# Patient Record
Sex: MALE | Employment: PART TIME | URBAN - METROPOLITAN AREA
[De-identification: names, ages, dates, MRNs, and addresses within clinical notes are randomized per-mention and may not be internally consistent; named-entity substitution may affect disease eponyms.]

---

## 2023-10-04 ENCOUNTER — TELEPHONE (OUTPATIENT)
Dept: HEMATOLOGY/ONCOLOGY | Facility: CLINIC | Age: 23
End: 2023-10-04
Payer: COMMERCIAL

## 2023-11-01 PROBLEM — D68.00 VON WILLEBRAND DISEASE (MULTI): Status: ACTIVE | Noted: 2023-11-01

## 2023-11-01 PROBLEM — R63.6 UNDERWEIGHT: Status: ACTIVE | Noted: 2023-11-01

## 2023-11-01 PROBLEM — J02.9 PHARYNGITIS: Status: ACTIVE | Noted: 2023-11-01

## 2023-11-01 RX ORDER — AMINOCAPROIC ACID 0.25 G/ML
4 SOLUTION ORAL EVERY 8 HOURS
COMMUNITY
Start: 2015-08-13 | End: 2024-05-06 | Stop reason: ALTCHOICE

## 2023-11-01 ASSESSMENT — PATIENT HEALTH QUESTIONNAIRE - PHQ9
2. FEELING DOWN, DEPRESSED, IRRITABLE, OR HOPELESS: 0
7. TROUBLE CONCENTRATING ON THINGS, SUCH AS READING THE NEWSPAPER OR WATCHING TELEVISION: 0
9. THOUGHTS THAT YOU WOULD BE BETTER OFF DEAD, OR OF HURTING YOURSELF: 0
8. MOVING OR SPEAKING SO SLOWLY THAT OTHER PEOPLE COULD HAVE NOTICED. OR THE OPPOSITE, BEING SO FIGETY OR RESTLESS THAT YOU HAVE BEEN MOVING AROUND A LOT MORE THAN USUAL: 0
1. LITTLE INTEREST OR PLEASURE IN DOING THINGS: 0
4. FEELING TIRED OR HAVING LITTLE ENERGY: 0
4. FEELING TIRED OR HAVING LITTLE ENERGY: NOT AT ALL
5. POOR APPETITE OR OVEREATING: 0
9. THOUGHTS THAT YOU WOULD BE BETTER OFF DEAD, OR OF HURTING YOURSELF: NOT AT ALL
1. LITTLE INTEREST OR PLEASURE IN DOING THINGS: NOT AT ALL
SUM OF ALL RESPONSES TO PHQ QUESTIONS 1-9: 0
8. MOVING OR SPEAKING SO SLOWLY THAT OTHER PEOPLE COULD HAVE NOTICED. OR THE OPPOSITE, BEING SO FIGETY OR RESTLESS THAT YOU HAVE BEEN MOVING AROUND A LOT MORE THAN USUAL: NOT AT ALL
SUM OF ALL RESPONSES TO PHQ QUESTIONS 1-9: 0
7. TROUBLE CONCENTRATING ON THINGS, SUCH AS READING THE NEWSPAPER OR WATCHING TELEVISION: NOT AT ALL
3. TROUBLE FALLING OR STAYING ASLEEP OR SLEEPING TOO MUCH: 0
4. FEELING TIRED OR HAVING LITTLE ENERGY: NOT AT ALL
5. POOR APPETITE OR OVEREATING: NOT AT ALL
9. THOUGHTS THAT YOU WOULD BE BETTER OFF DEAD, OR OF HURTING YOURSELF: NOT AT ALL
1. LITTLE INTEREST OR PLEASURE IN DOING THINGS: NOT AT ALL
2. FEELING DOWN, DEPRESSED, IRRITABLE, OR HOPELESS: NOT AT ALL
6. FEELING BAD ABOUT YOURSELF - OR THAT YOU ARE A FAILURE OR HAVE LET YOURSELF OR YOUR FAMILY DOWN: NOT AT ALL
2. FEELING DOWN, DEPRESSED OR HOPELESS: NOT AT ALL
5. POOR APPETITE OR OVEREATING: NOT AT ALL
6. FEELING BAD ABOUT YOURSELF - OR THAT YOU ARE A FAILURE OR HAVE LET YOURSELF OR YOUR FAMILY DOWN: 0
7. TROUBLE CONCENTRATING ON THINGS, SUCH AS READING THE NEWSPAPER OR WATCHING TELEVISION: NOT AT ALL
3. TROUBLE FALLING OR STAYING ASLEEP OR SLEEPING TOO MUCH: NOT AT ALL
6. FEELING BAD ABOUT YOURSELF - OR THAT YOU ARE A FAILURE OR HAVE LET YOURSELF OR YOUR FAMILY DOWN: NOT AT ALL
8. MOVING OR SPEAKING SO SLOWLY THAT OTHER PEOPLE COULD HAVE NOTICED. OR THE OPPOSITE, BEING SO FIGETY OR RESTLESS THAT YOU HAVE BEEN MOVING AROUND A LOT MORE THAN USUAL: NOT AT ALL
3. TROUBLE FALLING OR STAYING ASLEEP OR SLEEPING TOO MUCH: NOT AT ALL

## 2023-11-01 NOTE — PROGRESS NOTES
Patient ID: Pernell Jaime is a 22 y.o. male.    Subjective    HPI  22 anhidrosis (rather than sweating, he gets pain, red splots, aggravated by labor, began at age 19) presents for evaluation of bleeding.     He comes in with prescription  His dentist wrote for aminocaproic acid 0.25gm/mL 26 mL by mouth every 6 hours for 30 days.   Prescribed for bleeding in the gum from a broken tooth.  Age 3 years old.       He saw a hematologist in [place] and had a bleeding time,  he was diagnosed with a mild clotting disorder, with VWD  no restrictions     Epistaxis: no  Bruising/ Hematoma: no  Minor cutaneous wound: Any prolonged bleeding, longer than 5 minutes: yes  Gum bleeding: not now (when younger)  Dental procedure bleeding:  never had a proceedure  Surgical bleeding: broken pinkie, but not bleeding  Hematemesis, melena or hematochezia: no  ?? Angiodysplasia: no    Blood transfusion: no   Iron therapy: no    Muscle hematomas or hemarthrosis: no     Family history of bleeding:  not clear.  Mother noted to have heavy periods    Objective    BSA: There is no height or weight on file to calculate BSA.  There were no vitals taken for this visit.   Review of Systems   Constitutional:  Negative for diaphoresis.   HENT:  Negative for trouble swallowing.    Eyes:  Negative for visual disturbance.   Respiratory:  Negative for shortness of breath.    Cardiovascular:  Negative for leg swelling.   Gastrointestinal:  Negative for anal bleeding and blood in stool.   Endocrine: Negative for cold intolerance.   Genitourinary:  Negative for hematuria.   Musculoskeletal:  Negative for arthralgias.   Skin:  Positive for rash.   Allergic/Immunologic: Negative for environmental allergies.   Neurological:  Negative for numbness.   Hematological:  Does not bruise/bleed easily.   Psychiatric/Behavioral:  The patient is not nervous/anxious.        Physical Exam  Constitutional:       Appearance: Normal appearance.   HENT:      Head:  Normocephalic.      Right Ear: External ear normal.      Left Ear: External ear normal.      Nose: Nose normal.      Mouth/Throat:      Mouth: Mucous membranes are moist.   Eyes:      Extraocular Movements: Extraocular movements intact.      Pupils: Pupils are equal, round, and reactive to light.   Cardiovascular:      Rate and Rhythm: Normal rate and regular rhythm.   Pulmonary:      Effort: Pulmonary effort is normal.      Breath sounds: Normal breath sounds.   Abdominal:      General: Abdomen is flat. Bowel sounds are normal.   Musculoskeletal:         General: No swelling. Normal range of motion.      Cervical back: Normal range of motion and neck supple.   Skin:     General: Skin is warm.   Neurological:      General: No focal deficit present.      Mental Status: He is alert and oriented to person, place, and time.   Psychiatric:         Mood and Affect: Mood normal.         Behavior: Behavior normal.         Performance Status:        Assessment/Plan   Summary: 22-year-old man with a history of acquired anhidrosis (rather than sweating, he gets pain, red splots, aggravated by labor, began at age 19) presents for evaluation of bleeding.     #Prior diagnosis of von Willebrand disease  History: At age 3 the patient had a broken tooth which bled profusely.  His dentist wrote for aminocaproic acid 0.25gm/mL 26 mL by mouth every 6 hours for 30 days.  This was successful in managing his bleeding.  He then saw a hematologist in [place] and had a bleeding time, as well as other blood work.  He was diagnosed with a mild clotting disorder/ VWD.  However there were no restrictions on his activity.  He has no history of epistaxis, bruising, hematoma, gum bleeding, surgical bleeding, hematemesis, melena, hematochezia.  He has never had iron deficiency blood transfusion.  He does note bleeding from minor cutaneous cuts, lasting longer than 5 minutes.  Family history of bleeding:  not clear.  Mother noted to have heavy  periods  Ferritin 216, TIBC 393, 23% iron saturation  INR 1.08, PTT 32, fibrinogen 154,  ristocetin activity 63   Platelet aggregation: ADP max 31, collagen max 68, epinephrine max 10   Discussion: Platelet functional disorder detected   Possible type II von Willebrand's disease  We discussed the value of Amicar which clinically appears to have been beneficial per the patient's experience in the past.  We discussed that Amicar works On the fourth phase of thrombosis whereas von Willebrands disease and of dysfunctional platelet disorder works on the first phase of thrombosis.  Nevertheless, what Amicar lacks by not targeting the patient's specific bleeding disorder endocrine makes up for by increased convenience.  Plan: Full von Willebrand panel testing    fibrinogen and clotting time  RTC 6 months  Patient will call us before dental extraction:  Arrange for 1 unit platelets in the morning of dental extraction.  Amicar 1gm every 8 hours prn is reasonable     #Acquired Idiopathic Generalized Anhidrosis (AIGA)[Int J Mol Sci. 2021 Aug; 22(16): 1910]   A high frequency of the association with cholinergic urticaria (CholU)  he has not been to a dermatologist.  He gets extreme itching.  associated with decreased expression of cholinergic receptor M3 (CHRM3)  The association of autoimmune complications implies that AIGA is an autoimmune disorder. It has been reported that both CD4+ and CD8+ T cells infiltrate around eccrine gland epithelial cells in AIGA and CholU with anhidrosis/hypohidrosis [17]. Further  T cell subsets have not been investigated in AIGA. The complications provide insights for the subpopulations of T cells. Th17 cells are involved in the pathology of Crohn’s disease and rheumatoid arthritis  no diarrhea, no arthritits, no sodium issues  Tryptase 7.  IgE 4  Alternatively he could have hereditary sensory and autonomic neuropathy type 4 (HSAN4), linked to variants within the tyrosinase-kinase domain of the  NTRK1 gene on chromosome 1q23.1, which can lead to Defects in thermoregulation and anhidrosis  PLAN:   Consider genetic counseling    Cancer Staging   No matching staging information was found for the patient.    Oncology History    No history exists.                 Archie Lane MD

## 2023-11-02 ENCOUNTER — OFFICE VISIT (OUTPATIENT)
Dept: HEMATOLOGY/ONCOLOGY | Facility: CLINIC | Age: 23
End: 2023-11-02
Payer: COMMERCIAL

## 2023-11-02 VITALS
OXYGEN SATURATION: 98 % | HEART RATE: 83 BPM | TEMPERATURE: 96.3 F | WEIGHT: 158.95 LBS | RESPIRATION RATE: 16 BRPM | HEIGHT: 72 IN | BODY MASS INDEX: 21.53 KG/M2 | SYSTOLIC BLOOD PRESSURE: 122 MMHG | DIASTOLIC BLOOD PRESSURE: 79 MMHG

## 2023-11-02 DIAGNOSIS — D69.9 BLEEDING DISORDER (CMS-HCC): ICD-10-CM

## 2023-11-02 DIAGNOSIS — D68.00 VON WILLEBRAND DISEASE (MULTI): Primary | ICD-10-CM

## 2023-11-02 PROCEDURE — 99214 OFFICE O/P EST MOD 30 MIN: CPT | Performed by: INTERNAL MEDICINE

## 2023-11-02 PROCEDURE — 1036F TOBACCO NON-USER: CPT | Performed by: INTERNAL MEDICINE

## 2023-11-02 ASSESSMENT — ENCOUNTER SYMPTOMS
BLOOD IN STOOL: 0
ANAL BLEEDING: 0
LOSS OF SENSATION IN FEET: 0
SHORTNESS OF BREATH: 0
NERVOUS/ANXIOUS: 0
DIAPHORESIS: 0
TROUBLE SWALLOWING: 0
OCCASIONAL FEELINGS OF UNSTEADINESS: 0
DEPRESSION: 0
BRUISES/BLEEDS EASILY: 0
NUMBNESS: 0
ARTHRALGIAS: 0
HEMATURIA: 0

## 2023-11-02 ASSESSMENT — COLUMBIA-SUICIDE SEVERITY RATING SCALE - C-SSRS
1. IN THE PAST MONTH, HAVE YOU WISHED YOU WERE DEAD OR WISHED YOU COULD GO TO SLEEP AND NOT WAKE UP?: NO
2. HAVE YOU ACTUALLY HAD ANY THOUGHTS OF KILLING YOURSELF?: NO
6. HAVE YOU EVER DONE ANYTHING, STARTED TO DO ANYTHING, OR PREPARED TO DO ANYTHING TO END YOUR LIFE?: NO

## 2023-11-02 ASSESSMENT — PATIENT HEALTH QUESTIONNAIRE - PHQ9
SUM OF ALL RESPONSES TO PHQ9 QUESTIONS 1 AND 2: 0
2. FEELING DOWN, DEPRESSED OR HOPELESS: NOT AT ALL
1. LITTLE INTEREST OR PLEASURE IN DOING THINGS: NOT AT ALL

## 2023-11-02 ASSESSMENT — PAIN SCALES - GENERAL: PAINLEVEL: 0-NO PAIN

## 2023-11-02 NOTE — PROGRESS NOTES
Patient here for follow up with Dr. Lane accompanied by his mom.     Medications and allergies reviewed.     Patient to return for lab draws and then follow up in 6 months.

## 2024-05-02 ENCOUNTER — APPOINTMENT (OUTPATIENT)
Dept: HEMATOLOGY/ONCOLOGY | Facility: CLINIC | Age: 24
End: 2024-05-02
Payer: COMMERCIAL

## 2024-05-03 ENCOUNTER — TELEPHONE (OUTPATIENT)
Dept: HEMATOLOGY/ONCOLOGY | Facility: CLINIC | Age: 24
End: 2024-05-03
Payer: COMMERCIAL

## 2024-05-06 ENCOUNTER — APPOINTMENT (OUTPATIENT)
Dept: LAB | Facility: CLINIC | Age: 24
End: 2024-05-06
Payer: COMMERCIAL

## 2024-05-06 ENCOUNTER — OFFICE VISIT (OUTPATIENT)
Dept: HEMATOLOGY/ONCOLOGY | Facility: CLINIC | Age: 24
End: 2024-05-06
Payer: COMMERCIAL

## 2024-05-06 VITALS
RESPIRATION RATE: 16 BRPM | BODY MASS INDEX: 22.43 KG/M2 | WEIGHT: 165.57 LBS | DIASTOLIC BLOOD PRESSURE: 75 MMHG | HEIGHT: 72 IN | TEMPERATURE: 97.8 F | SYSTOLIC BLOOD PRESSURE: 128 MMHG | OXYGEN SATURATION: 97 % | HEART RATE: 88 BPM

## 2024-05-06 DIAGNOSIS — L74.0 HEAT RASH: ICD-10-CM

## 2024-05-06 DIAGNOSIS — D68.00 VON WILLEBRAND DISEASE (MULTI): ICD-10-CM

## 2024-05-06 DIAGNOSIS — D69.1 ABNORMAL PLATELET AGGREGATION (MULTI): ICD-10-CM

## 2024-05-06 LAB
BASOPHILS # BLD AUTO: 0.03 X10*3/UL (ref 0–0.1)
BASOPHILS NFR BLD AUTO: 0.4 %
EOSINOPHIL # BLD AUTO: 0.06 X10*3/UL (ref 0–0.7)
EOSINOPHIL NFR BLD AUTO: 0.8 %
ERYTHROCYTE [DISTWIDTH] IN BLOOD BY AUTOMATED COUNT: 12.3 % (ref 11.5–14.5)
HCT VFR BLD AUTO: 45.8 % (ref 41–52)
HGB BLD-MCNC: 16 G/DL (ref 13.5–17.5)
IMM GRANULOCYTES # BLD AUTO: 0.01 X10*3/UL (ref 0–0.7)
IMM GRANULOCYTES NFR BLD AUTO: 0.1 % (ref 0–0.9)
LYMPHOCYTES # BLD AUTO: 1.72 X10*3/UL (ref 1.2–4.8)
LYMPHOCYTES NFR BLD AUTO: 23.7 %
MCH RBC QN AUTO: 29.1 PG (ref 26–34)
MCHC RBC AUTO-ENTMCNC: 34.9 G/DL (ref 32–36)
MCV RBC AUTO: 83 FL (ref 80–100)
MONOCYTES # BLD AUTO: 0.72 X10*3/UL (ref 0.1–1)
MONOCYTES NFR BLD AUTO: 9.9 %
NEUTROPHILS # BLD AUTO: 4.73 X10*3/UL (ref 1.2–7.7)
NEUTROPHILS NFR BLD AUTO: 65.1 %
NRBC BLD-RTO: 0 /100 WBCS (ref 0–0)
PLATELET # BLD AUTO: 299 X10*3/UL (ref 150–450)
RBC # BLD AUTO: 5.49 X10*6/UL (ref 4.5–5.9)
WBC # BLD AUTO: 7.3 X10*3/UL (ref 4.4–11.3)

## 2024-05-06 PROCEDURE — 99214 OFFICE O/P EST MOD 30 MIN: CPT | Performed by: INTERNAL MEDICINE

## 2024-05-06 PROCEDURE — 85025 COMPLETE CBC W/AUTO DIFF WBC: CPT | Performed by: INTERNAL MEDICINE

## 2024-05-06 PROCEDURE — 36415 COLL VENOUS BLD VENIPUNCTURE: CPT | Performed by: INTERNAL MEDICINE

## 2024-05-06 PROCEDURE — 1036F TOBACCO NON-USER: CPT | Performed by: INTERNAL MEDICINE

## 2024-05-06 RX ORDER — AMINOCAPROIC ACID 1000 MG/1
4 TABLET ORAL EVERY 6 HOURS
Qty: 160 TABLET | Refills: 3 | Status: SHIPPED | OUTPATIENT
Start: 2024-05-06 | End: 2024-06-15

## 2024-05-06 RX ORDER — AMINOCAPROIC ACID 1000 MG/1
4 TABLET ORAL EVERY 6 HOURS
Qty: 160 TABLET | Refills: 3 | Status: SHIPPED | OUTPATIENT
Start: 2024-05-06 | End: 2024-05-06

## 2024-05-06 ASSESSMENT — PATIENT HEALTH QUESTIONNAIRE - PHQ9
2. FEELING DOWN, DEPRESSED OR HOPELESS: NOT AT ALL
SUM OF ALL RESPONSES TO PHQ9 QUESTIONS 1 AND 2: 0
1. LITTLE INTEREST OR PLEASURE IN DOING THINGS: NOT AT ALL

## 2024-05-06 ASSESSMENT — ENCOUNTER SYMPTOMS
DEPRESSION: 0
LOSS OF SENSATION IN FEET: 0
OCCASIONAL FEELINGS OF UNSTEADINESS: 0

## 2024-05-06 ASSESSMENT — COLUMBIA-SUICIDE SEVERITY RATING SCALE - C-SSRS
6. HAVE YOU EVER DONE ANYTHING, STARTED TO DO ANYTHING, OR PREPARED TO DO ANYTHING TO END YOUR LIFE?: NO
1. IN THE PAST MONTH, HAVE YOU WISHED YOU WERE DEAD OR WISHED YOU COULD GO TO SLEEP AND NOT WAKE UP?: NO
2. HAVE YOU ACTUALLY HAD ANY THOUGHTS OF KILLING YOURSELF?: NO

## 2024-05-06 ASSESSMENT — PAIN SCALES - GENERAL: PAINLEVEL: 0-NO PAIN

## 2024-05-06 NOTE — PROGRESS NOTES
"Patient ID: Pernell Jaime is a 23 y.o. male.    Subjective: vWD      Heme/Onc History:  22 anhidrosis (rather than sweating, he gets pain, red splots, aggravated by labor, began at age 19) presents for evaluation of bleeding.     He comes in with prescription  His dentist wrote for aminocaproic acid 0.25gm/mL 26 mL by mouth every 6 hours for 30 days.   Prescribed for bleeding in the gum from a broken tooth.  Age 3 years old.       He saw a hematologist in [place] and had a bleeding time,  he was diagnosed with a mild clotting disorder, with VWD  no restrictions     Epistaxis: no  Bruising/ Hematoma: no  Minor cutaneous wound: Any prolonged bleeding, longer than 5 minutes: yes  Gum bleeding: not now (when younger)  Dental procedure bleeding:  never had a proceedure  Surgical bleeding: broken pinkie, but not bleeding  Hematemesis, melena or hematochezia: no  ?? Angiodysplasia: no    Blood transfusion: no   Iron therapy: no    Muscle hematomas or hemarthrosis: no     Family history of bleeding:  not clear.  Mother noted to have heavy periods      Family History:     Family History   Family history unknown: Yes       Past Medical History  No past medical history on file.     Surgical history: No past surgical history on file.   reports that he has never smoked. He has never used smokeless tobacco.    Review Of Systems:  As per in HPI, otherwise all other 12 point of ROS are negative.    Physical Exam:  /75 (BP Location: Left arm, Patient Position: Sitting, BP Cuff Size: Adult long)   Pulse 88   Temp 36.6 °C (97.8 °F) (Temporal)   Resp 16   Ht (S) 1.83 m (6' 0.05\")   Wt 75.1 kg (165 lb 9.1 oz)   SpO2 97%   BMI 22.43 kg/m²   BSA: 1.95 meters squared  General: awake/alert/oriented x3, no distress, alert and cooperative  Head: Short hair fully covering scalp. Symmetric facial expressions  Eyes: PERRL, EOMI, clear sclera, eyebrows present.  Ears/Nose/Mouth/Throat:  Oral mucous membranes moist. No oral " "ulcers. No palpable pre/post-auricular lymph nodes  Neck: No palpable cervical chain lymph nodes  Respiratory: unlabored breathing on room air, good chest expansion, thorax symmetric  Cardio: Regular rate and rhythm, normal S1 and S2, radial pulses symmetric  GI: Nondistended, soft, non-tender abdomen  Musculoskeletal: Normal muscle bulk and tone, ROM intact, no joint swelling.  Rises from chair and walks unassisted.  Extremities: No ankle swelling, no arm or leg wounds  Neuro: Alert, cognition intact, speech normal. Facial expressions symmetric.  No motor deficits noted. Sensation intact to touch and hot/cold.   Able to stand from seated position unassisted and walks around the room unassisted.  Psychological: Appropriate mood and behavior.  Skin: Warm and dry, no lesions, no rashes    Results:  Diagnostic Results   No results found for: \"WBC\", \"HGB\", \"HCT\", \"MCV\", \"PLT\"  Lab Results   Component Value Date    CALCIUM 9.8 09/27/2023     09/27/2023    K 3.6 09/27/2023    CO2 27 09/27/2023     09/27/2023    BUN 14 09/27/2023    CREATININE 0.87 09/27/2023    ALT 19 09/27/2023    AST 21 09/27/2023     Legacy Encounter on 09/27/2023   Component Date Value Ref Range Status    Von Willebrand Factor(Ristocetin C* 09/27/2023 63  51 - 215 % Final    Comment: REFERENCE INTERVAL: von Willebrand Factor, Activity (RCF)  Access complete set of age- and/or gender-specific reference   intervals for this test in the [facility] Directory   ([email or web address]).  Performed By: [facility]  [address] [place] [id number]  : Kvng Wright MD, PhD  CLIA Number: [id number]      Ferritin 09/27/2023 216  20 - 300 ug/L Final    Iron 09/27/2023 90  35 - 150 ug/dL Final    TIBC 09/27/2023 393  240 - 445 ug/dL Final    Iron Saturation 09/27/2023 23 (L)  25 - 45 % Final    Von Willebrand Ag 09/27/2023 154  50 - 220 % Final    IgE 09/27/2023 4  0 - 214 IU/mL Final    Tryptase 09/27/2023 7.0  " <11.0 mcg/L Final    Comment: The Tryptase test, fluorescent enzyme immunoassay  (FEIA), measures both the Alpha and Beta forms of  Tryptase. Measuring both forms of Tryptase increases  sensitivity for the diagnosis of mastocytosis, and  mast cell degranulation as a cause of anaphylaxis.      Protime 09/27/2023 10.9  9.3 - 12.7 sec Final    INR 09/27/2023 1.08  0.86 - 1.16 Final    Glucose 09/27/2023 112 (H)  65 - 99 mg/dL Final    Sodium 09/27/2023 139  133 - 145 mmol/L Final    Potassium 09/27/2023 3.6  3.4 - 5.1 mmol/L Final    Chloride 09/27/2023 101  97 - 107 mmol/L Final    Bicarbonate 09/27/2023 27  24 - 31 mmol/L Final    Anion Gap 09/27/2023 15  0 - 19 mmol/L Final    Urea Nitrogen 09/27/2023 14  8 - 25 mg/dL Final    Creatinine 09/27/2023 0.87  0.40 - 1.60 mg/dL Final    GFR MALE 09/27/2023 >90  >90 mL/min/1.73m2 Final    Comment:  CALCULATIONS OF ESTIMATED GFR ARE PERFORMED   USING THE 2021 CKD-EPI STUDY REFIT EQUATION   WITHOUT THE RACE VARIABLE FOR THE IDMS-TRACEABLE   CREATININE METHODS.    https://jasn.asnjournals.org/content/early/2021/09/22/ASN.6476633396      Calcium 09/27/2023 9.8  8.5 - 10.4 mg/dL Final    Albumin 09/27/2023 5.1 (H)  3.5 - 5.0 g/dL Final    Alkaline Phosphatase 09/27/2023 62  35 - 125 U/L Final    Total Protein 09/27/2023 7.7  5.9 - 7.9 g/dL Final    AST 09/27/2023 21  5 - 40 U/L Final    Total Bilirubin 09/27/2023 0.7  0.1 - 1.2 mg/dL Final    ALT (SGPT) 09/27/2023 19  5 - 40 U/L Final    Comment:  Patients treated with Sulfasalazine may generate    falsely decreased results for ALT.      Von Willebrand Multimers 09/27/2023 COMMENT   Final    Comment: TEST PERFORMED AT  [facility]      [address] [place]  [address]  VWF multimer analysis demonstrates a normal pattern and  distribution of bands. This is the pattern of multimers  that occurs in normal individuals as well as in those with  type 1 von Willebrand disease (VWD), type 2M and type  2N  VWD. Some acquired von Willebrand syndrome cases may yield  a normal pattern as well.  No additional results available for further interpretation.  This test was developed and its performance characteristics  determined by [facility].  It has not been cleared or approved  by the Food and Drug Administration.      ADP MAX AGGREGATION 09/27/2023 31 (L)  65 - 93 % MAX Final    ADP: ATP RELEASE 09/27/2023 0.0 (L)  0.1 - 1.3 nM Final    ADP 20 MAX AGGREGATION 09/27/2023 44 (L)  71 - 94 % MAX Final    ADP 20: ATP RELEASE 09/27/2023 0.1  0.1 - 1.4 nM Final    ARACH: MAX AGGREGATION 09/27/2023 85  75 - 100 % MAX Final    ARACH: ATP RELEASE 09/27/2023 0.30 (L)  0.40 - 2.00 nM Final    COLLAGEN MAX AGGREGATION 09/27/2023 68 (L)  74 - 99 % MAX Final    COLLAGEN: ATP RELEASE 09/27/2023 0.70  0.40 - 1.70 nM Final    EPINEPH MAX AGGREGATION 09/27/2023 10 (L)  70 - 97 % MAX Final    EPINEPH: ATP RELEASE 09/27/2023 0.00 (L)  0.20 - 1.60 nM Final    EPINEPH 100 MAX AGGREGATION 09/27/2023 15 (L)  70 - 99 % MAX Final    EPINEPH 100 ATP RELEASE 09/27/2023 0.00 (L)  0.20 - 1.70 nM Final    Thromboxane A2 uM Max Aggregation 09/27/2023 58  58 - 93 % MAX Final    ATP Release by Thromboxane A2 09/27/2023 0.40  0.20 - 1.40 nM Final    Thrombin uM ATP Release 09/27/2023 0.90  >=0.5 nM Final    RISTO 1500 MAX AGGREGATION 09/27/2023 86.0  76.0 - 100.0 % MAX Final    RISTO 1200 MAX AGGREGATION 09/27/2023 80.0  76.0 - 100.0 % MAX Final    RISTO 900 MAX AGGREGATION 09/27/2023 67.0  50.0 - 100.0 % MAX Final    RISTO 500 MAX AGG 09/27/2023 5.0  0.0 - 9.0 % MAX Final    PLATELET AGGREGATION INTERPRETATION 09/27/2023 ABNORMAL   Final    Comment: A laboratory study of platelet aggregation and stimulated release was   performed. The platelet aggregation study is abnormal. The platelet   aggregation study shows abnormal aggregation to arachidonic acid (0.5 mg/ml).   collagen (2ug/ml), epinephrine (10 uM)and epinephrine (100uM) with normal   response to  ADP (5 and 20 uM) and thromboxane A2. The stimulated dense   granule release shows abnormal release to ADP (5uM), arachidonic acid (0.5   mg/ml) and epinephrine (10 and 100 uM) with normal response to ADP (20uM),   collagen (2ug/ml) , thromboxane A2, and thrombin. The ristocetin induced   platelet aggregation shows an essentially normal dose response.    SUMMARY  The pattern of platelet reactivity is nonspecific. However, the most   significant abnormality is with arachidonic acid, suggestion an aspirin like   drug effect. However, other storage disorders such as Glanzmann   thromboasthenia, Puneet Soulier disease and platelet dense granule storage   pool disorder cannot be exclud                           ed.    This interpretation is rendered in the absence of clinical history. Please   correlate these laboratory results with clinical findings and medication   history.      PLATELET AGGREGATION REVIEW 09/27/2023 SEE BELOW   Final    Rosaline White DO    aPTT 09/27/2023 32  27 - 38 sec Final    Note new reference range as of 6/20/2023 at 10:00am.          Current Outpatient Medications:     aminocaproic acid (Amicar) 1,000 mg tablet, Take 4 tablets (4,000 mg) by mouth every 6 hours., Disp: 160 tablet, Rfl: 3     Radiology:    Pathology:    Assessment/Plan:  vWD: Possible type II von Willebrand's disease by Dr. Lane.  vWF multimer analysis was normal. vWF ag and F8 was normal. Platelet aggregation was abnormal. He denies taking ASA or SSRI.    I am not very convinced about vWD Type 2. He has a ristocetin cofactor assay ~50% of his VWF antigen.  He could a Type I or Type 2A but multimers are normal.    Refer to Genetic testing for plt aggregation disorders.    Use Amicar 4 grm q6 hrs PRN for minor bleeding. I will refer him to Dr. Chaitanya Reis to complete testing and confirm the diagnosis.   His work-up is incomplete. VWF collagen binding assay, VWF GPIb activity assay, FVIII, fibrinogen, thrombin time,  reptilase time will need to be ordered.      Check CBC today    2- Heat rash: refer to Dermatology.    RTC in 3 months with labs    Diagnoses and all orders for this visit:  Von Willebrand disease (Multi)  -     Clinic Appointment Request Follow Up; [facility]  -     Clinic Appointment Request; Future  -     CBC and Auto Differential; Future  -     Comprehensive metabolic panel; Future  -     Iron and TIBC; Future  -     Vitamin B12; Future  -     CBC and Auto Differential; Future  -     aminocaproic acid (Amicar) 1,000 mg tablet; Take 4 tablets (4,000 mg) by mouth every 6 hours.       Performance Status: Asymptomatic    I spent more than 30 minutes for the patient today, including face-to-face conversation, pre-visit preparation, post-visit orders, and others.   Tip Juarez MD

## 2024-05-06 NOTE — PATIENT INSTRUCTIONS
A referral was placed for dermatology and genetics.     Please follow up with Dr. Whelan in 3 months. Please obtain labs prior to your visit.     Dr. Whelan sent a new prescription for oral Amicar to your designated [facility].    Please feel free to call with any questions or concerns at [phone].

## 2024-05-06 NOTE — PROGRESS NOTES
Patient here today for follow up with his father. He last saw Dr. Lane in November. Still having sweating with exertion.     He needs his wisdom teeth removed.     Fatigue: denies any issues  PO intake: adequate      Medications reviewed with patient.   Liquid Amicar was changed to oral Amicar.     CBC today.   Referral to genetics for platelet aggregation.   Referral to dermatology for heat rash.     Work letter given to patient for heat rash precautions.     Follow up in 3 months with labs prior.

## 2024-05-06 NOTE — LETTER
May 6, 2024     Patient: Pernell Fam Jaime   YOB: 2000   Date of Visit: 5/6/2024       To Whom It May Concern:    Pernell Jaime should avoid exertion or any activity that will result in sweating. Pernell also needs to avoid heat and sun exposure.     If you have any questions or concerns, please don't hesitate to call.         Sincerely,        Tip Juarez MD    CC: No Recipients

## 2024-05-07 DIAGNOSIS — D68.00 VON WILLEBRAND DISEASE (MULTI): ICD-10-CM

## 2024-05-08 ENCOUNTER — TELEPHONE (OUTPATIENT)
Dept: HEMATOLOGY/ONCOLOGY | Facility: HOSPITAL | Age: 24
End: 2024-05-08
Payer: COMMERCIAL

## 2024-05-08 NOTE — TELEPHONE ENCOUNTER
RN called patient and was unable to leave a voice message. RN sent meesage to patient's mobile number aksing him to call RN back at .

## 2024-05-24 ENCOUNTER — OFFICE VISIT (OUTPATIENT)
Dept: DERMATOLOGY | Facility: CLINIC | Age: 24
End: 2024-05-24
Payer: COMMERCIAL

## 2024-05-24 DIAGNOSIS — L50.5 URTICARIA, CHOLINERGIC: Primary | ICD-10-CM

## 2024-05-24 PROCEDURE — 99204 OFFICE O/P NEW MOD 45 MIN: CPT | Performed by: STUDENT IN AN ORGANIZED HEALTH CARE EDUCATION/TRAINING PROGRAM

## 2024-05-24 RX ORDER — EPINEPHRINE 0.3 MG/.3ML
1 INJECTION SUBCUTANEOUS ONCE AS NEEDED
Qty: 1 EACH | Refills: 0 | Status: SHIPPED | OUTPATIENT
Start: 2024-05-24

## 2024-05-24 RX ORDER — CETIRIZINE HYDROCHLORIDE 10 MG/1
TABLET ORAL
Qty: 60 TABLET | Refills: 11 | Status: SHIPPED | OUTPATIENT
Start: 2024-05-24

## 2024-05-24 ASSESSMENT — DERMATOLOGY QUALITY OF LIFE (QOL) ASSESSMENT
DATE THE QUALITY-OF-LIFE ASSESSMENT WAS COMPLETED: 66984
RATE HOW EMOTIONALLY BOTHERED YOU ARE BY YOUR SKIN PROBLEM (FOR EXAMPLE, WORRY, EMBARRASSMENT, FRUSTRATION): 6 - ALWAYS BOTHERED
RATE HOW BOTHERED YOU ARE BY EFFECTS OF YOUR SKIN PROBLEMS ON YOUR ACTIVITIES (EG, GOING OUT, ACCOMPLISHING WHAT YOU WANT, WORK ACTIVITIES OR YOUR RELATIONSHIPS WITH OTHERS): 6 - ALWAYS BOTHERED
ARE THERE EXCLUSIONS OR EXCEPTIONS FOR THE QUALITY OF LIFE ASSESSMENT: NO
WHAT SINGLE SKIN CONDITION LISTED BELOW IS THE PATIENT ANSWERING THE QUALITY-OF-LIFE ASSESSMENT QUESTIONS ABOUT: NONE OF THE ABOVE
RATE HOW BOTHERED YOU ARE BY SYMPTOMS OF YOUR SKIN PROBLEM (EG, ITCHING, STINGING BURNING, HURTING OR SKIN IRRITATION): 5

## 2024-05-24 ASSESSMENT — DERMATOLOGY PATIENT ASSESSMENT
HAVE YOU HAD OR DO YOU HAVE VASCULAR DISEASE: NO
DO YOU HAVE ANY NEW OR CHANGING LESIONS: NO
DO YOU USE A TANNING BED: NO
HAVE YOU HAD OR DO YOU HAVE A STAPH INFECTION: NO
DO YOU USE SUNSCREEN: OCCASIONALLY
ARE YOU AN ORGAN TRANSPLANT RECIPIENT: NO

## 2024-05-24 ASSESSMENT — PATIENT GLOBAL ASSESSMENT (PGA): PATIENT GLOBAL ASSESSMENT: PATIENT GLOBAL ASSESSMENT:  2 - MILD

## 2024-05-24 ASSESSMENT — ITCH NUMERIC RATING SCALE: HOW SEVERE IS YOUR ITCHING?: 10

## 2024-05-24 NOTE — PATIENT INSTRUCTIONS
Try taking 2 zyrtec a day  Ifstill not good enough, you can take an extra one at bedtime so total of 3 a day  Make sure its not making your eyes or mouth too dry or giving you funny heart beats    Although I'm sure its never going to happen, I'm giving you an epipen to inject if your episode ever has airway swelling with it    My plan Bis a medication called Xolair, its an injection-   See me in 4 month          Pernell has exercise intolerance due to hives  The entire year he cannot get the carts at work

## 2024-05-24 NOTE — PROGRESS NOTES
Subjective     Pernell Jaime is a 23 y.o. male who presents for the following: heat rash (Mid to upper body).     Review of Systems:  No other skin or systemic complaints other than what is documented elsewhere in the note.    The following portions of the chart were reviewed this encounter and updated as appropriate:          Skin Cancer History  No skin cancer on file.      Specialty Problems    None       Objective   Well appearing patient in no apparent distress; mood and affect are within normal limits.    A focused skin examination was performed. All findings within normal limits unless otherwise noted below.    Assessment/Plan   1. Urticaria, cholinergic    Urticarial papules on arms/legs/chest, wax and wane, few appreciated today on upper chest  Per patient, heat/cold/exercise triggers it  Episodes last a few minutes to an hour  Improved when he stops exertion    I do favor a diagnosis of cholinergic or exercise induced urticaria  Explained rarely can be associated with anaphylaxis so sent rx for epipen    Also, will try titration of anti histamines: zyrtec bid, can add benadryl prn if needed  Slowly can work his way down anti histamines as tolerated    Related Medications  EPINEPHrine (Epipen) 0.3 mg/0.3 mL injection syringe  Inject 0.3 mL (0.3 mg) into the muscle 1 time if needed for anaphylaxis for up to 1 dose. Inject into upper leg. Call 911 after use.    cetirizine (ZyrTEC) 10 mg tablet  Take one in the morning and one in the evening    Try taking 2 zyrtec a day  Ifstill not good enough, you can take an extra one at bedtime so total of 3 a day  Make sure its not making your eyes or mouth too dry or giving you funny heart beats    Although I'm sure its never going to happen, I'm giving you an epipen to inject if your episode ever has airway swelling with it    My plan Bis a medication called Xolair, its an injection-   See me in 4 month

## 2024-06-27 ENCOUNTER — TELEPHONE (OUTPATIENT)
Dept: HEMATOLOGY/ONCOLOGY | Facility: HOSPITAL | Age: 24
End: 2024-06-27
Payer: COMMERCIAL

## 2024-07-02 ENCOUNTER — OFFICE VISIT (OUTPATIENT)
Dept: HEMATOLOGY/ONCOLOGY | Facility: HOSPITAL | Age: 24
End: 2024-07-02
Payer: COMMERCIAL

## 2024-07-02 ENCOUNTER — LAB (OUTPATIENT)
Dept: LAB | Facility: HOSPITAL | Age: 24
End: 2024-07-02
Payer: COMMERCIAL

## 2024-07-02 VITALS
WEIGHT: 169.97 LBS | DIASTOLIC BLOOD PRESSURE: 76 MMHG | OXYGEN SATURATION: 100 % | TEMPERATURE: 97.3 F | BODY MASS INDEX: 23.02 KG/M2 | HEART RATE: 80 BPM | SYSTOLIC BLOOD PRESSURE: 127 MMHG | RESPIRATION RATE: 18 BRPM

## 2024-07-02 DIAGNOSIS — D68.00 VON WILLEBRAND DISEASE (MULTI): ICD-10-CM

## 2024-07-02 LAB
APTT PPP: 32 SECONDS (ref 27–38)
FACT VIII ACT/NOR PPP: 123 % (ref 55–180)
HOLD SPECIMEN: NORMAL
INR PPP: 1.2 (ref 0.9–1.1)
PROTHROMBIN TIME: 13.4 SECONDS (ref 9.8–12.8)
THROMBIN TIME: 13.8 SECONDS (ref 10.3–16.6)
VWF AG ACT/NOR PPP IA: 93 % (ref 50–220)

## 2024-07-02 PROCEDURE — 85610 PROTHROMBIN TIME: CPT

## 2024-07-02 PROCEDURE — 99215 OFFICE O/P EST HI 40 MIN: CPT | Performed by: INTERNAL MEDICINE

## 2024-07-02 PROCEDURE — 36415 COLL VENOUS BLD VENIPUNCTURE: CPT

## 2024-07-02 PROCEDURE — 85397 CLOTTING FUNCT ACTIVITY: CPT

## 2024-07-02 PROCEDURE — 85670 THROMBIN TIME PLASMA: CPT

## 2024-07-02 PROCEDURE — 85246 CLOT FACTOR VIII VW ANTIGEN: CPT

## 2024-07-02 PROCEDURE — 83520 IMMUNOASSAY QUANT NOS NONAB: CPT

## 2024-07-02 PROCEDURE — 85240 CLOT FACTOR VIII AHG 1 STAGE: CPT

## 2024-07-02 ASSESSMENT — PAIN SCALES - GENERAL: PAINLEVEL: 0-NO PAIN

## 2024-07-02 NOTE — PROGRESS NOTES
"Patient ID: Pernell Jaime is a 23 y.o. male.  DATE: 7/2/24  Reason for visit: VWD  History of Present Illness:     Pernell Jaime is a 23-year-old man with a past medical history of VWD who presents with his mother today to establish care. He states he is \"here to see someone about my blood condition.\" His mother reports that he was first diagnosed with VWD as a baby, when he had a prolonged bleeding time. She states he was prescribed Amicar at the time, which he used following a tooth extraction when he was 1.5 years old. He has since had one episode when he hit his head and had superficial bleeding when he was 12, for which he took Amicar. Otherwise, he reports not having used Amicar in the past ten years or so. He denies any bruising, nosebleeds, hematuria, hematochezia, melena. He states he is planning to eventually have his four wisdom teeth removed, which is not yet scheduled. He played basketball growing up, but never had any bruising or bleeding, even with physical contact. He had no abnormal bleeding after his baby teeth fell out.     Social: Denies tobacco, alcohol, drug use. 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     Family: Denies family history of bleeding. His mother had heavy cycles and occasional nosebleeds as a child, however she was tested for VWD and was negative. No family history of any clotting. His mother's father had liver cancer.     Allergies: Breaks out in hives with exertion, takes cetirizine for this.     Past Medical History:   Active Ambulatory Problems     Diagnosis Date Noted    Pharyngitis 11/01/2023    Underweight 11/01/2023    Von Willebrand disease (Multi) 11/01/2023     Resolved Ambulatory Problems     Diagnosis Date Noted    No Resolved Ambulatory Problems     No Additional Past Medical History      Surgical History:    Pernell has no past surgical history on file.  Social History:    Pernell Jaime  reports that he has never " smoked. He has never used smokeless tobacco.  He  reports no history of alcohol use.  He  reports no history of drug use.  Family History:    Family History   Family history unknown: Yes     Surgical History:  No past surgical history on file.   Family Oncology History:    Family history is unknown by patient.    ROS    12-point review of systems completed and negative except as stated above.     Allergies  No Known Allergies     Medications    Current Outpatient Medications:     cetirizine (ZyrTEC) 10 mg tablet, Take one in the morning and one in the evening, Disp: 60 tablet, Rfl: 11    EPINEPHrine (Epipen) 0.3 mg/0.3 mL injection syringe, Inject 0.3 mL (0.3 mg) into the muscle 1 time if needed for anaphylaxis for up to 1 dose. Inject into upper leg. Call 911 after use., Disp: 1 each, Rfl: 0    VS:  /76 (BP Location: Left arm, Patient Position: Sitting, BP Cuff Size: Adult)   Pulse 80   Temp 36.3 °C (97.3 °F) (Temporal)   Resp 18   Wt 77.1 kg (169 lb 15.6 oz)   SpO2 100%   BMI 23.02 kg/m²   Weight:   Vitals:    07/02/24 1103   Weight: 77.1 kg (169 lb 15.6 oz)       BSA: 1.98 meters squared    Physical Exam  Constitutional:       General: He is not in acute distress.     Appearance: Normal appearance.   HENT:      Head: Normocephalic and atraumatic.      Mouth/Throat:      Mouth: Mucous membranes are moist.   Eyes:      Pupils: Pupils are equal, round, and reactive to light.   Cardiovascular:      Rate and Rhythm: Normal rate and regular rhythm.      Heart sounds: No murmur heard.     No friction rub.   Pulmonary:      Effort: Pulmonary effort is normal. No respiratory distress.      Breath sounds: Normal breath sounds. No wheezing, rhonchi or rales.   Abdominal:      General: There is no distension.      Palpations: Abdomen is soft. There is no mass.      Tenderness: There is no abdominal tenderness. There is no guarding or rebound.      Hernia: No hernia is present.   Musculoskeletal:         General:  No swelling or deformity.   Skin:     General: Skin is warm and dry.      Findings: No rash.   Neurological:      General: No focal deficit present.      Mental Status: He is alert and oriented to person, place, and time.   Psychiatric:         Mood and Affect: Mood normal.         Behavior: Behavior normal.       Diagnostic Results     Labs:  Lab Results   Component Value Date    WBC 7.3 05/06/2024    HGB 16.0 05/06/2024    HCT 45.8 05/06/2024    MCV 83 05/06/2024     05/06/2024      Lab Results   Component Value Date    NEUTROABS 4.73 05/06/2024      Lab Results   Component Value Date    GLUCOSE 112 (H) 09/27/2023    CALCIUM 9.8 09/27/2023     09/27/2023    K 3.6 09/27/2023    CO2 27 09/27/2023     09/27/2023    BUN 14 09/27/2023    CREATININE 0.87 09/27/2023     Lab Results   Component Value Date    ALT 19 09/27/2023    AST 21 09/27/2023    ALKPHOS 62 09/27/2023    BILITOT 0.7 09/27/2023     Assessment/Plan   Pernell Jaime is a 23-year-old man with a past medical history of VWD who presents with his mother today to establish care and in preparation for wisdom teeth removal. On review, the patient has no current bleeding symptoms, even throughout childhood. VWD testing on file from September of last year is not indicative of VWD, although it is possible he had the disease when he was a baby and his levels have since improved. He does have abnormal platelet aggregation testing, although again has no symptoms. Will repeat work-up.     Treatment Plan:  - Repeat VWF antigen, Gp1bM, VWF collagen binding, FVIII, coagulation screen, thrombin time.  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   - RTC 4 week phone visit.     Patient seen and discussed with Dr. Gutiérrez.    Braden Nicole MD

## 2024-07-05 ENCOUNTER — DOCUMENTATION (OUTPATIENT)
Dept: HEMATOLOGY/ONCOLOGY | Facility: HOSPITAL | Age: 24
End: 2024-07-05
Payer: COMMERCIAL

## 2024-07-05 NOTE — PROGRESS NOTES
HTC Nursing Note    Pernell is a 23 y.o male seen today to establish care for management of VWD. He was diagnosed when he was a baby and has been prescribed Amicar in the past for a tooth bleed and a superficial head bleed. He has not used Amicar in the past ten years. He denies any bleeding or bruising. He is planning to have his four wisdom teeth removed but it is not scheduled yet. Plan is to repeat VWD work up and get VWF antigen, Gp1bM, VWF collagen binding, FVIII, coagulation screen, thrombin time labs. Will follow up in 4 weeks with a phone visit. Medical alert card updated, reviewed, and given to patient.

## 2024-07-09 LAB — VWF GP1BM ACTIVITY: 65 IU/DL (ref 52–180)

## 2024-07-10 LAB — VWF CBA INHIBITOR PPP CHRO-ACNC: 78 IU/DL (ref 50–203)

## 2024-07-30 ENCOUNTER — TELEMEDICINE (OUTPATIENT)
Dept: HEMATOLOGY/ONCOLOGY | Facility: HOSPITAL | Age: 24
End: 2024-07-30
Payer: COMMERCIAL

## 2024-07-30 DIAGNOSIS — D68.00 VON WILLEBRAND DISEASE (MULTI): ICD-10-CM

## 2024-07-30 DIAGNOSIS — D68.9 COAGULOPATHY (MULTI): Primary | ICD-10-CM

## 2024-07-30 PROCEDURE — 99214 OFFICE O/P EST MOD 30 MIN: CPT | Performed by: INTERNAL MEDICINE

## 2024-07-30 NOTE — PROGRESS NOTES
"Patient ID: Pernell Jaime is a 23 y.o. male.  DATE: 7/2/24  Reason for visit: VWD  History of Present Illness:     Pernell Jaime is a 23-year-old man with a past medical history of VWD who presents with his mother today to establish care. He states he is \"here to see someone about my blood condition.\" His mother reports that he was first diagnosed with VWD as a baby, when he had a prolonged bleeding time. She states he was prescribed Amicar at the time, which he used following a tooth extraction when he was 1.5 years old. He has since had one episode when he hit his head and had superficial bleeding when he was 12, for which he took Amicar. Otherwise, he reports not having used Amicar in the past ten years or so. He denies any bruising, nosebleeds, hematuria, hematochezia, melena. He states he is planning to eventually have his four wisdom teeth removed, which is not yet scheduled. He played basketball growing up, but never had any bruising or bleeding, even with physical contact. He had no abnormal bleeding after his baby teeth fell out.     INTERVAL HISTORY 07/30/24  Patient doing well, denies any bleeding or bruising  Feels well, will scheduled dental extraction soon  Discussed all work up for VWD normal done on 7/2/24  12 point review of systems negative except as noted above      Social: Denies tobacco, alcohol, drug use. 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     Family: Denies family history of bleeding. His mother had heavy cycles and occasional nosebleeds as a child, however she was tested for VWD and was negative. No family history of any clotting. His mother's father had liver cancer.     Allergies: Breaks out in hives with exertion, takes cetirizine for this.     Past Medical History:   Active Ambulatory Problems     Diagnosis Date Noted    Pharyngitis 11/01/2023    Underweight 11/01/2023    Von Willebrand disease (Multi) 11/01/2023     Resolved Ambulatory " Problems     Diagnosis Date Noted    No Resolved Ambulatory Problems     No Additional Past Medical History      Surgical History:    Pernell has no past surgical history on file.  Social History:    Pernell Jaime  reports that he has never smoked. He has never used smokeless tobacco.  He  reports no history of alcohol use.  He  reports no history of drug use.  Family History:    Family History   Family history unknown: Yes     Surgical History:  No past surgical history on file.   Family Oncology History:    Family history is unknown by patient.    Allergies  No Known Allergies     Medications    Current Outpatient Medications:     cetirizine (ZyrTEC) 10 mg tablet, Take one in the morning and one in the evening, Disp: 60 tablet, Rfl: 11    EPINEPHrine (Epipen) 0.3 mg/0.3 mL injection syringe, Inject 0.3 mL (0.3 mg) into the muscle 1 time if needed for anaphylaxis for up to 1 dose. Inject into upper leg. Call 911 after use., Disp: 1 each, Rfl: 0    VS:  There were no vitals taken for this visit.  Phone visit, speaks in full sentences, speech clear and fluent    Diagnostic Results     Labs:  Lab Results   Component Value Date    WBC 7.3 05/06/2024    HGB 16.0 05/06/2024    HCT 45.8 05/06/2024    MCV 83 05/06/2024     05/06/2024               Component  Ref Range & Units 4 wk ago  (7/2/24) 10 mo ago  (9/27/23) 10 mo ago  (9/27/23) 10 mo ago  (9/25/23) 10 mo ago  (9/25/23)   Protime  9.8 - 12.8 seconds 13.4 High   10.9 R CANCELED R, CM    INR  0.9 - 1.1 1.2 High   1.08 R CANCELED R, CM    aPTT  27 - 38 seconds 32 32 R, CM      0 Result Notes      Component  Ref Range & Units 4 wk ago   Thrombin Time  10.3 - 16.6 seconds 13.8                 Component  Ref Range & Units 4 wk ago  (7/2/24) 10 mo ago  (9/27/23) 10 mo ago  (9/25/23)   Von Willebrand Ag  50 - 220 % 93 154             Component  Ref Range & Units 4 wk ago   VWF Collagen Binding  50 - 203 IU/dL 78            Component  Ref Range & Units 4 wk  ago   VWF GP1bM Activity  52 - 180 IU/dL 65            Component  Ref Range & Units 4 wk ago   Factor VIII Activity  55 - 180 % 123      0 Result Notes       1 Follow-up Encounter       Component  Ref Range & Units 10 mo ago  (9/27/23) 10 mo ago  (9/25/23)   ADP MAX AGGREGATION  65 - 93 % MAX 31 Low  CANCELED R, CM   ADP: ATP RELEASE  0.1 - 1.3 nM 0.0 Low  CANCELED R, CM   ADP 20 MAX AGGREGATION  71 - 94 % MAX 44 Low  CANCELED R, CM   ADP 20: ATP RELEASE  0.1 - 1.4 nM 0.1 CANCELED R, CM   ARACH: MAX AGGREGATION  75 - 100 % MAX 85 CANCELED R, CM   ARACH: ATP RELEASE  0.40 - 2.00 nM 0.30 Low  CANCELED R, CM   COLLAGEN MAX AGGREGATION  74 - 99 % MAX 68 Low  CANCELED R, CM   COLLAGEN: ATP RELEASE  0.40 - 1.70 nM 0.70 CANCELED R, CM   EPINEPH MAX AGGREGATION  70 - 97 % MAX 10 Low  CANCELED R, CM   EPINEPH: ATP RELEASE  0.20 - 1.60 nM 0.00 Low  CANCELED R, CM   EPINEPH 100 MAX AGGREGATION  70 - 99 % MAX 15 Low  CANCELED R, CM   EPINEPH 100 ATP RELEASE  0.20 - 1.70 nM 0.00 Low  CANCELED R, CM   Thromboxane A2 uM Max Aggregation  58 - 93 % MAX 58 CANCELED R, CM   ATP Release by Thromboxane A2  0.20 - 1.40 nM 0.40 CANCELED R, CM   Thrombin uM ATP Release  >=0.5 nM 0.90 CANCELED R, CM   RISTO 1500 MAX AGGREGATION  76.0 - 100.0 % MAX 86.0 CANCELED R, CM   RISTO 1200 MAX AGGREGATION  76.0 - 100.0 % MAX 80.0 CANCELED R, CM   RISTO 900 MAX AGGREGATION  50.0 - 100.0 % MAX 67.0 CANCELED R, CM   RISTO 500 MAX AGG  0.0 - 9.0 % MAX 5.0 CANCELED R, CM   PLATELET AGGREGATION INTERPRETATION ABNORMAL CANCELED CM   Comment: A laboratory study of platelet aggregation and stimulated release was  performed. The platelet aggregation study is abnormal. The platelet  aggregation study shows abnormal aggregation to arachidonic acid (0.5 mg/ml).  collagen (2ug/ml), epinephrine (10 uM)and epinephrine (100uM) with normal  response to ADP (5 and 20 uM) and thromboxane A2. The stimulated dense  granule release shows abnormal release to ADP (5uM),  arachidonic acid (0.5  mg/ml) and epinephrine (10 and 100 uM) with normal response to ADP (20uM),  collagen (2ug/ml) , thromboxane A2, and thrombin. The ristocetin induced  platelet aggregation shows an essentially normal dose response.    SUMMARY  The pattern of platelet reactivity is nonspecific. However, the most  significant abnormality is with arachidonic acid, suggestion an aspirin like  drug effect. However, other storage disorders such as Glanzmann  thromboasthenia, Puneet Soulier disease and platelet dense granule storage  pool disorder cannot be excluded.          Assessment/Plan   Pernell Jaime is a 23-year-old man with a past medical history of VWD who presents with his mother today to establish care and in preparation for wisdom teeth removal. On review, the patient has no current bleeding symptoms, even throughout childhood. VWD testing on file from September of last year and now 7/24 is not indicative of VWD, although it is possible he had the disease when he was a baby and his levels have since improved. Our repeat testing is also no consistent with VWD. Discussed that He does have abnormal platelet aggregation testing, although again has no symptoms. Hence only recommendations for dental extraction would be to use anti-fibrinolytic agent if he notes any increased bleeding post procedure.    He does have an isolated slightly prolonged PT and we discussed checking a factor VII level today.    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   - RTC 6 week phone visit.     Plan of care discussed in detail with patient. All questions and concerns addressed and patient was comfortable with the plan of care.          Sophia Gutiérrez MD

## 2024-08-07 ENCOUNTER — OFFICE VISIT (OUTPATIENT)
Dept: HEMATOLOGY/ONCOLOGY | Facility: CLINIC | Age: 24
End: 2024-08-07
Payer: COMMERCIAL

## 2024-08-07 VITALS
OXYGEN SATURATION: 96 % | RESPIRATION RATE: 18 BRPM | HEART RATE: 77 BPM | TEMPERATURE: 97.6 F | BODY MASS INDEX: 22.56 KG/M2 | WEIGHT: 166.56 LBS | SYSTOLIC BLOOD PRESSURE: 124 MMHG | DIASTOLIC BLOOD PRESSURE: 73 MMHG

## 2024-08-07 DIAGNOSIS — D68.00 VON WILLEBRAND DISEASE (MULTI): ICD-10-CM

## 2024-08-07 PROCEDURE — 99215 OFFICE O/P EST HI 40 MIN: CPT | Performed by: INTERNAL MEDICINE

## 2024-08-07 ASSESSMENT — COLUMBIA-SUICIDE SEVERITY RATING SCALE - C-SSRS
2. HAVE YOU ACTUALLY HAD ANY THOUGHTS OF KILLING YOURSELF?: NO
1. IN THE PAST MONTH, HAVE YOU WISHED YOU WERE DEAD OR WISHED YOU COULD GO TO SLEEP AND NOT WAKE UP?: NO
6. HAVE YOU EVER DONE ANYTHING, STARTED TO DO ANYTHING, OR PREPARED TO DO ANYTHING TO END YOUR LIFE?: NO

## 2024-08-07 ASSESSMENT — PATIENT HEALTH QUESTIONNAIRE - PHQ9
2. FEELING DOWN, DEPRESSED OR HOPELESS: NOT AT ALL
1. LITTLE INTEREST OR PLEASURE IN DOING THINGS: NOT AT ALL
SUM OF ALL RESPONSES TO PHQ9 QUESTIONS 1 AND 2: 0

## 2024-08-07 ASSESSMENT — PAIN SCALES - GENERAL: PAINLEVEL: 0-NO PAIN

## 2024-08-07 NOTE — PROGRESS NOTES
Patient here for follow up visit 3 month follow up post labs, Von Willabrandt disease. Confirmed no Von Willibrandt. Levels abnormal   Needs to have dental work including tooth extraction. Needs to know how to handle. Needs all 4 wisdom teeth removed.    Zyrtec has helped rash emensly.     Patient here with Jose Soto     Medications and Allergies reviewed and reconciled this visit.    Follow up per MD request. Discharged from services at this time.     Patient reports availability and use of [employer], Reviewed this is a good place to communicate with the team as well as review labs and upcoming orders.      No barriers to education noted, patient agrees to current plan and verbalized understanding using teach back method.

## 2024-08-07 NOTE — PROGRESS NOTES
Patient ID: Pernell Jaime is a 23 y.o. male.    Subjective: vWD      Heme/Onc History:  22 anhidrosis (rather than sweating, he gets pain, red splots, aggravated by labor, began at age 19) presents for evaluation of bleeding.     He comes in with prescription  His dentist wrote for aminocaproic acid 0.25gm/mL 26 mL by mouth every 6 hours for 30 days.   Prescribed for bleeding in the gum from a broken tooth.  Age 3 years old.       He saw a hematologist in [place] and had a bleeding time,  he was diagnosed with a mild clotting disorder, with VWD  no restrictions     Epistaxis: no  Bruising/ Hematoma: no  Minor cutaneous wound: Any prolonged bleeding, longer than 5 minutes: yes  Gum bleeding: not now (when younger)  Dental procedure bleeding:  never had a proceedure  Surgical bleeding: broken pinkie, but not bleeding  Hematemesis, melena or hematochezia: no  ?? Angiodysplasia: no    Blood transfusion: no   Iron therapy: no    Muscle hematomas or hemarthrosis: no     Family history of bleeding:  not clear.  Mother noted to have heavy periods      Family History:     Family History   Family history unknown: Yes       Past Medical History  No past medical history on file.     Surgical history: No past surgical history on file.   reports that he has never smoked. He has never used smokeless tobacco.    Review Of Systems:  As per in HPI, otherwise all other 12 point of ROS are negative.    Physical Exam:  /73 (BP Location: Left arm, Patient Position: Sitting, BP Cuff Size: Adult long)   Pulse 77   Temp 36.4 °C (97.6 °F) (Temporal)   Resp 18   Wt 75.5 kg (166 lb 8.9 oz)   SpO2 96%   BMI 22.56 kg/m²   BSA: 1.96 meters squared  General: awake/alert/oriented x3, no distress, alert and cooperative  Head: Short hair fully covering scalp. Symmetric facial expressions  Eyes: PERRL, EOMI, clear sclera, eyebrows present.  Ears/Nose/Mouth/Throat:  Oral mucous membranes moist. No oral ulcers. No palpable  pre/post-auricular lymph nodes  Neck: No palpable cervical chain lymph nodes  Respiratory: unlabored breathing on room air, good chest expansion, thorax symmetric  Cardio: Regular rate and rhythm, normal S1 and S2, radial pulses symmetric  GI: Nondistended, soft, non-tender abdomen  Musculoskeletal: Normal muscle bulk and tone, ROM intact, no joint swelling.  Rises from chair and walks unassisted.  Extremities: No ankle swelling, no arm or leg wounds  Neuro: Alert, cognition intact, speech normal. Facial expressions symmetric.  No motor deficits noted. Sensation intact to touch and hot/cold.   Able to stand from seated position unassisted and walks around the room unassisted.  Psychological: Appropriate mood and behavior.  Skin: Warm and dry, no lesions, no rashes    Results:  Diagnostic Results   Lab Results   Component Value Date    WBC 7.3 05/06/2024    HGB 16.0 05/06/2024    HCT 45.8 05/06/2024    MCV 83 05/06/2024     05/06/2024     Lab Results   Component Value Date    CALCIUM 9.8 09/27/2023     09/27/2023    K 3.6 09/27/2023    CO2 27 09/27/2023     09/27/2023    BUN 14 09/27/2023    CREATININE 0.87 09/27/2023    ALT 19 09/27/2023    AST 21 09/27/2023     Lab on 07/02/2024   Component Date Value Ref Range Status    Von Willebrand Ag 07/02/2024 93  50 - 220 % Final    VWF Collagen Binding 07/02/2024 78  50 - 203 IU/dL Final        For additional information, please visit   [email or web address]  This test was developed and its performance characteristics   determined by [facility] It  has not been cleared or approved by the US Food and Drug   Administration. This test is used for  clinical purposes. It should not be regarded as investigational or   for research. This laboratory is  certified under the Clinical Laboratory Improvement Amendments   (CLIA) as qualified to perform high  complexity clinical laboratory testing.  Performed by: [facility]  [address] [address]    VWF GP1bM Activity 07/02/2024 65  52 - 180 IU/dL Final        For additional information, please visit   [email or web address]  This test was developed and its performance characteristics   determined by [facility] It  has not been cleared or approved by the US Food and Drug   Administration. This test is used for  clinical purposes. It should not be regarded as investigational or   for research. This laboratory is  certified under the Clinical Laboratory Improvement Amendments   (CLIA) as qualified to perform high  complexity clinical laboratory testing.  Performed by: [facility] [address] [place] [id number]    Factor VIII Activity 07/02/2024 123  55 - 180 % Final    Protime 07/02/2024 13.4 (H)  9.8 - 12.8 seconds Final    INR 07/02/2024 1.2 (H)  0.9 - 1.1 Final    aPTT 07/02/2024 32  27 - 38 seconds Final    Thrombin Time 07/02/2024 13.8  10.3 - 16.6 seconds Final    Extra Tube 07/02/2024 Hold for add-ons.   Final    Auto resulted.          Current Outpatient Medications:     cetirizine (ZyrTEC) 10 mg tablet, Take one in the morning and one in the evening, Disp: 60 tablet, Rfl: 11    EPINEPHrine (Epipen) 0.3 mg/0.3 mL injection syringe, Inject 0.3 mL (0.3 mg) into the muscle 1 time if needed for anaphylaxis for up to 1 dose. Inject into upper leg. Call 911 after use., Disp: 1 each, Rfl: 0     Radiology:    Pathology:    Assessment/Plan:  vWD: Possible type II von Willebrand's disease by Dr. Lane.  vWF multimer analysis was normal. vWF ag and F8 was normal. Platelet aggregation was abnormal. He denies taking ASA or SSRI.    VWD testing on file from September of last year and now 7/24 is not indicative of VWD, although it is possible he had the disease when he was a baby and his levels have since improved. Our repeat testing is also no consistent with VWD. Discussed that He does have abnormal platelet  aggregation testing, although again has no symptoms. Hence only recommendations for dental extraction would be to use anti-fibrinolytic agent if he notes any increased bleeding post procedure.     Use Amicar 4 grm q6 hrs PRN for minor bleeding.    2- Heat rash: referred to Dermatology and Claritin po BID given. All of his symptoms resolved. Now he can plays ports with his friends.    No need for further follow up with hematology. He will call as needed    Diagnoses and all orders for this visit:  Von Willebrand disease (Multi)  -     Clinic Appointment Request       Performance Status: Asymptomatic    I spent more than 30 minutes for the patient today, including face-to-face conversation, pre-visit preparation, post-visit orders, and others.   Tip Juarez MD

## 2024-09-04 ENCOUNTER — APPOINTMENT (OUTPATIENT)
Dept: GENETICS | Facility: CLINIC | Age: 24
End: 2024-09-04
Payer: COMMERCIAL

## 2024-09-18 ENCOUNTER — APPOINTMENT (OUTPATIENT)
Dept: HEMATOLOGY/ONCOLOGY | Facility: HOSPITAL | Age: 24
End: 2024-09-18
Payer: COMMERCIAL

## 2024-09-27 ENCOUNTER — APPOINTMENT (OUTPATIENT)
Dept: DERMATOLOGY | Facility: CLINIC | Age: 24
End: 2024-09-27
Payer: COMMERCIAL

## 2024-09-27 DIAGNOSIS — L50.5 URTICARIA, CHOLINERGIC: Primary | ICD-10-CM

## 2024-09-27 PROCEDURE — 99213 OFFICE O/P EST LOW 20 MIN: CPT | Performed by: STUDENT IN AN ORGANIZED HEALTH CARE EDUCATION/TRAINING PROGRAM

## 2024-09-27 ASSESSMENT — DERMATOLOGY QUALITY OF LIFE (QOL) ASSESSMENT
ARE THERE EXCLUSIONS OR EXCEPTIONS FOR THE QUALITY OF LIFE ASSESSMENT: NO
RATE HOW BOTHERED YOU ARE BY EFFECTS OF YOUR SKIN PROBLEMS ON YOUR ACTIVITIES (EG, GOING OUT, ACCOMPLISHING WHAT YOU WANT, WORK ACTIVITIES OR YOUR RELATIONSHIPS WITH OTHERS): 0 - NEVER BOTHERED
RATE HOW BOTHERED YOU ARE BY SYMPTOMS OF YOUR SKIN PROBLEM (EG, ITCHING, STINGING BURNING, HURTING OR SKIN IRRITATION): 0 - NEVER BOTHERED
RATE HOW EMOTIONALLY BOTHERED YOU ARE BY YOUR SKIN PROBLEM (FOR EXAMPLE, WORRY, EMBARRASSMENT, FRUSTRATION): 0 - NEVER BOTHERED
DATE THE QUALITY-OF-LIFE ASSESSMENT WAS COMPLETED: 67110

## 2024-09-27 ASSESSMENT — PATIENT GLOBAL ASSESSMENT (PGA): PATIENT GLOBAL ASSESSMENT: PATIENT GLOBAL ASSESSMENT:  2 - MILD

## 2024-09-27 ASSESSMENT — ITCH NUMERIC RATING SCALE: HOW SEVERE IS YOUR ITCHING?: 0

## 2024-09-27 ASSESSMENT — DERMATOLOGY PATIENT ASSESSMENT: DO YOU HAVE ANY NEW OR CHANGING LESIONS: NO

## 2024-09-27 NOTE — PROGRESS NOTES
Subjective     Pernell Jaime is a 23 y.o. male who presents for the following: Urticaria, cholinergic (Mid upper body - Tx Zyrtec).     Review of Systems:  No other skin or systemic complaints other than what is documented elsewhere in the note.    The following portions of the chart were reviewed this encounter and updated as appropriate:          Skin Cancer History  No skin cancer on file.      Specialty Problems    None       Objective   Well appearing patient in no apparent distress; mood and affect are within normal limits.    A focused skin examination was performed. All findings within normal limits unless otherwise noted below.    Assessment/Plan   1. Urticaria, cholinergic  Clear today    Urticarial papules on arms/legs/chest, wax and wane, clear today on exam. Significant improvement with zyrtec BID, able to exercise now.  Per patient, heat/cold/exercise triggers it  Episodes last a few minutes to an hour  Improved when he stops exertion     I do favor a diagnosis of cholinergic or exercise induced urticaria  Explained rarely can be associated with anaphylaxis so sent rx for epipen   -Currently on zyrtec BID (can icnrease to TID as needed), can add benadryl prn if needed. Denies dry eyes, mouth, abnormal heart beat, or abnormal urination frequency. Counseled on increasing hydration due to HA after exercise, could see a neurologist for further evaluation due to migraine history. Possible exercise-induced migraines.  -discussed how he can wean the zyrtec to once a day in the future  -Given epipen  -My plan B is a medication called Xolair, its an injection    Related Medications  EPINEPHrine (Epipen) 0.3 mg/0.3 mL injection syringe  Inject 0.3 mL (0.3 mg) into the muscle 1 time if needed for anaphylaxis for up to 1 dose. Inject into upper leg. Call 911 after use.    cetirizine (ZyrTEC) 10 mg tablet  Take one in the morning and one in the evening        Sugey Knutson MD  Department of Dermatology  I  was present during all key portions of visit including history, exam, discussion/plan and/or procedures and directly supervised our resident during all portions of the visit, follow up care, medications and more    Hunter Garcia MD